# Patient Record
Sex: FEMALE
[De-identification: names, ages, dates, MRNs, and addresses within clinical notes are randomized per-mention and may not be internally consistent; named-entity substitution may affect disease eponyms.]

---

## 2019-07-11 NOTE — PDOC.OPDEL
OB Operative/Delivery Note


Delivery Dr/Surgeon: South


Assist: Light


Pre-Delivery Diagnosis: ruptured membrane


Procedure/Post Delivery Dx: repeat low transverse CS


Weeks gestation: 37


Anesthesia: spinal





- Findings


  ** A


Sex: female


Weight: 6 lb 6 oz


Apgar - 1 min: 8


Apgar - 5 min: 9





- Additional Findings/Plan


Placenta delivered: manual removal


 findings: low transverse hysterotomy without extension, normal uterus, 

normal tubes, normal ovaries


Estimated blood loss: 600ml


Post delivery plan: routine recovery

## 2019-07-11 NOTE — OP
DATE OF PROCEDURE:  2019



PREOPERATIVE DIAGNOSES:  

1. Ruptured membranes at 37 weeks, not in active labor.

2. Declines trial of labor after previous  section.



POSTOPERATIVE DIAGNOSIS:  Status post repeat low-transverse  section.



ASSISTANT:  Jaky Gay CNM D.N.P.



ANESTHESIA:  Spinal per Dr. Adams.



COMPLICATIONS:  None.



ESTIMATED BLOOD LOSS:  600 mL.



PROCEDURES PERFORMED:  Repeat low-transverse  section.



OPERATIVE FINDINGS:  

1. Low-transverse hysterotomy without extension.

2. No intraabdominal adhesive disease.

3. Vigorous female infant, 6 pounds 6 ounces.  Apgars 8 and 9 with delayed cord

clamping and skin-to-skin in the OR, sent to [facility]. 

4. Normal-appearing uterus, tubes, and ovaries bilaterally.

5. Normal-appearing placenta with three-vessel cord and clear amniotic fluid noted.

6. Surgical site is hemostatic.



INDICATIONS FOR PROCEDURE:  Ms. Gabrielle Tobias is a 29-year-old female with a previous

 section, who would consider a trial of labor after  section during

her prenatal course.  She had been counseled by our nurse midwife, Sarah Gay, as

well as myself under obstetrical visits.  On her presentation today, it was

confirmed ruptured membranes.  She declined trial of labor and requested a repeat

 at that time.  I was called in to perform the procedure for the patient. 



DESCRIPTION OF PROCEDURE:  The patient was taken back to the OR with IV fluids

running.  When she was in the OR, spinal anesthesia was obtained, and the patient

was placed in dorsal supine position with a left lateral tilt.  After the patient

was in supine position, a House catheter was placed using sterile technique.

Prophylactic antibiotics were administered.  The abdomen was prepped and draped in

normal fashion for  section.  Surgeons were gowned and gloved.  The abdomen

was tested and anesthesia was found to be adequate.  A Pfannenstiel skin incision

was made with a scalpel.  The skin incision was carried down through the

subcutaneous tissue to the fascia.  Once the fascia was reached, it was incised in

the midline and extended superolaterally using curved Salguero scissors.  Kocher clamps

were then placed at the superior border of the fascia, which was sharply and bluntly

dissected off the rectus abdominis muscles and caudad in the cephalad direction.  In

similar fashion, they were placed at the inferior border of the fascia, which was

dissected down in a caudad direction toward the pubic symphysis.  Once the fascia

was dissected off the rectus abdominis muscles, the rectus muscles were bluntly

 in the midline.  The peritoneum was bluntly entered and stretched

laterally.  An Bro O retractor was placed in the peritoneal cavity for

retraction, visualization, and protection of the wound.  A bladder flap was created

with the Metzenbaum and the bladder reflection was dissected away from the planned

hysterotomy site.  Hysterotomy was made with a scalpel.  A low transverse

hysterotomy was made.  The hysterotomy was bluntly stretched.  Amniotomy was

performed with an Allis clamp and clear fluid was noted.  The infant was then

delivered vertex through the incision without difficulty.  The nose and mouth were

suctioned.  The infant was wrapped in a warm sterile blanket while we waited delayed

cord clamping for approximately 1 minute.  After approximately 1 minute, the cord

was doubly clamped and cut, and the infant was handed off to special care nurse in

attendance.  Cord blood was collected.  The placenta was delivered.  Uterus was

exteriorized, massaged to firm, and cleared of clot and debris.  The uterus was

returned to the abdominal cavity.  The hysterotomy was inspected and no extension

was noted.  Hysterotomy was then closed in a running locked fashion with Monocryl

suture.  After the hysterotomy was closed, it was noted to be hemostatic.  The

hysterotomy and paracolic gutters were irrigated and suctioned dry.  Hysterotomy was

inspected again with no bleeding noted.  The Bro O retractor was then removed

from the abdominal cavity.  The rectus muscle and fascia were inspected with a small

area of bleeding noted on the rectus muscle on the patient's left side.  This

bleeding was controlled with Bovie cauterization.  After hemostasis was assured, the

rectus fascia was reapproximated with PDS suture in a running fashion.  After the

fascia was closed the full length of the incision, the subcutaneous tissue was

irrigated and dried.  Any small areas of bleeding were controlled with Bovie

cauterization.  Subcutaneous tissue was reapproximated with plain gut suture.  The

skin was closed with Monocryl suture and dressed with Dermabond dressing.  The

uterine fundus was noted to be firm at the end of the case.  The patient tolerated

the procedure well.  She was transferred to the recovery room in good condition. 







Job ID:  [id number]

## 2019-07-11 NOTE — PDOC.LDHP
Labor and Delivery H&P


Chief complaint: loss of fluid


HPI: 





Patient was had a small trickle last night, but got in the shower and did not 

think anything of it.  At 0330, she was using the restroom and had a small gush 

of fluid that she was sure was her water.  She had considered TOLAC, however, 

now she desires a RCD


Current gestational age (weeks): 37


Dating criteria: last menstrual period (confirmed with first trimester 

ultrasound)


Grav: 2


Para: 1


OB History Details: 





Primary LTCS in  for failure to dilate. 


Current pregnancy complications: other (labial varicosity)


Abnormal US findings: No


Past Medical History: 





Obesity


Current medications: pre- vitamins, other (Rhogam 19 Loratadine)


Previous surgical history: low tranverse CS


Allergies/Adverse Reactions: 


 Allergies











Allergy/AdvReac Type Severity Reaction Status Date / Time


 


Iodinated Contrast- Oral and Allergy   Verified 19 05:33





IV Dye     











Social history: none





- Physical Exam


Vital signs reviewed and normal: yes (/70s, Temp 98. HR 95, RR 18)


General: NAD


Lungs: nonlabored breathing


Abdomen: gravid


FHT: category 1





- Vaginal Exam


cm dilated: 0





- OB Labs


Blood type: O


RH: negative


Antibody Screen: negative


HIV: negative


RPR: negative


HEPSAg: negative


1 hour GCT: positive


3 hour GTT: neg


GBS: negative


Urine drug screen: negative


Rubella: immune





- Assessment


L&D Assessment: term rupture in membranes





- Plan


Plan: admit to L&D, to OR for  section (Dr. Valverde is primary, Jaky Gay CNM 1st assist.), anesthesia consult for pain management

## 2019-07-12 NOTE — PDOC.PP
Post Partum Progress Note


Post Partum Day #: 1


Subjective: 





doing well,  no concerns, baby latching well


PO intake tolerated: yes


Flatus: yes


Ambulation: yes


 Vital Signs (12 hours)











  Temp Pulse Resp BP BP Pulse Ox


 


 19 08:00  98.5 F  67  12  97/54 L   96


 


 19 05:10  98.8 F  101 H  18   109/60 


 


 19 00:40  98.9 F  72  16  99/52 L  








 Weight











Weight                         217 lb

















- Physical Examination


General: NAD


Respiratory: non-labored breathing


Abdominal: no distention


Fundus firm & at: below umb


Skin: CS incision dry & intact (dressing cdi)


Psychiatric: A&Ox3, normal affect


Result Diagrams: 


 19 05:21





Additional Labs: 


 Post Partum Labs











Blood Type  O NEGATIVE   19  07:57    


 


Hep Bs Antigen  Non-Reactive S/CO (NonReactive)   19  07:57    











(1) PROM (premature rupture of membranes)


Code(s): O42.90 - MASOOD ROM, 7TH0 BETW RUPT & ONST LABR, UNSP WEEKS OF GEST   

Status: Acute   





(2)  delivery delivered


Code(s): O82 - ENCOUNTER FOR  DELIVERY WITHOUT INDICATION   Status: 

Acute   





(3) Previous  delivery affecting pregnancy


Code(s): O34.219 - MATERNAL CARE FOR UNSP TYPE SCAR FROM PREVIOUS  DEL 

  Status: Acute   





- Assessment/Plan





POD1 doing well sp RCS for PROM @ term and declined TOLAC.  Likely DC tomorrow.

## 2019-07-13 NOTE — PDOC.EVN
Event Note





- Event Note


Event Note: 





Patient now wishes to go home today.  Meeting all milestones, d/c order placed.

## 2020-09-17 NOTE — MRI
MR ANGIOGRAPHY OF THE [place]



HISTORY:

Family history of brain aneurysm. Regular headaches.



COMPARISON: 

None.



TECHNIQUE: 

MR angiography of the [place] performed in the axial plane utilizing 3D, time-of-flight imag
ing. Maximum intensity projection images are submitted for dictation. Diffusion-weighted imaging

was also performed.



FINDINGS:

Absent restricted diffusion.



There is symmetric flow related signal in the visualized cervical internal carotid arteries and intra
cranial internal carotid arteries.



Anterior circulation: Symmetric flow related signal in the A1 segments, M1 segments, proximal A2 segm
ents and proximal MCA branches. There is a subtle focus of flow related signal involving the

expected region of the anterior communicating artery. The possibility of a small 0.2 cm anterior comm
unicating artery aneurysm cannot be entirely excluded. CT angiogram of the [place] or

conventional angiography is recommended. There is no abnormal flow related signal in the left or righ
t MCA trifurcation.



Posterior circulation: Left vertebral artery is dominant. Bilateral PICA artery origins have appropri
ate flow related signal. Basilar artery and bilateral P1 segments have appropriate flow related

signal.



IMPRESSION:

1. No evidence of significant vascular stenosis of the [place].

2. No evidence of restricted diffusion.

3. Small focus of flow related signal in the expected region of the anterior communicating artery. Th
e possibility of a small anterior communicating artery aneurysm cannot be entirely excluded. CT

angiogram of the [place] versus conventional angiography is recommended.



Transcribed Date/Time: 9/17/2020 11:50 AM



Reported By: Silas Vieira 

Electronically Signed:  9/17/2020 12:29 PM

## 2021-01-07 NOTE — PRG
Body Location Override (Optional - Billing Will Still Be Based On Selected Body Map Location If Applicable): right lateral cheek DATE OF SERVICE:  2019



SUBJECTIVE:  The patient is postop day #2 status post a repeat  at term.

She is tolerating p.o., having good pain control, decreased lochia, and ambulating

and voiding on her own.  She does admit to waking up in the mornings with a lot of

pain in her hands and wrists due to the carpal tunnel and the worsening swelling

that she has had since delivery. 



OBJECTIVE:  MOST RECENT VITAL SIGNS:  Blood pressure is 110/54, temperature 98,

pulse is 74, and respiratory rate of 18. 

GENERAL:  She appears to be in no acute distress.  She is alert, oriented,

cooperative, and pleasant to interact with. 

HEAD:  Normocephalic, atraumatic. 

ABDOMEN:  Fundus is firm.  Incision is clean, dry, and intact. 

EXTREMITIES:  Nontender.



ASSESSMENT AND PLAN:  The patient is postop day #2 status post a repeat .

We will plan on continued postoperative care here until tomorrow and reassess. 







Job ID:  [id number] Detail Level: Detailed Depth Of Biopsy: dermis Was A Bandage Applied: Yes Size Of Lesion In Cm: 1 X Size Of Lesion In Cm: 0.6 Anticipated Plan (Based On Presumed Biopsy Results): Mohs pending biopsy Biopsy Type: H and E Biopsy Method: Personna blade Anesthesia Type: 1% lidocaine with 1:100,000 epinephrine and a 1:10 solution of 8.4% sodium bicarbonate Additional Anesthesia Volume In Cc (Will Not Render If 0): 0 Hemostasis: Electrocautery Wound Care: Bacitracin Dressing: bandage Destruction After The Procedure: No Type Of Destruction Used: Curettage Curettage Text: The wound bed was treated with curettage after the biopsy was performed. Cryotherapy Text: The wound bed was treated with cryotherapy after the biopsy was performed. Electrodesiccation Text: The wound bed was treated with electrodesiccation after the biopsy was performed. Electrodesiccation And Curettage Text: The wound bed was treated with electrodesiccation and curettage after the biopsy was performed. Silver Nitrate Text: The wound bed was treated with silver nitrate after the biopsy was performed. Lab: [id number] Lab Facility: [id number] Post-Care Instructions: I reviewed with the patient in detail post-care instructions. Notification Instructions: Patient will be notified of biopsy results. Billing Type: Third-Party Bill Information: Selecting Yes will display possible errors in your note based on the variables you have selected. This validation is only offered as a suggestion for you. PLEASE NOTE THAT THE VALIDATION TEXT WILL BE REMOVED WHEN YOU FINALIZE YOUR NOTE. IF YOU WANT TO FAX A PRELIMINARY NOTE YOU WILL NEED TO TOGGLE THIS TO 'NO' IF YOU DO NOT WANT IT IN YOUR FAXED NOTE.

## 2022-11-03 ENCOUNTER — HOSPITAL ENCOUNTER (OUTPATIENT)
Dept: HOSPITAL 92 - BICRAD | Age: 32
Discharge: HOME | End: 2022-11-03
Attending: FAMILY MEDICINE
Payer: COMMERCIAL

## 2022-11-03 DIAGNOSIS — M47.26: Primary | ICD-10-CM

## 2022-11-03 PROCEDURE — 72100 X-RAY EXAM L-S SPINE 2/3 VWS: CPT
